# Patient Record
Sex: MALE | Race: WHITE
[De-identification: names, ages, dates, MRNs, and addresses within clinical notes are randomized per-mention and may not be internally consistent; named-entity substitution may affect disease eponyms.]

---

## 2018-02-19 ENCOUNTER — HOSPITAL ENCOUNTER (OUTPATIENT)
Dept: HOSPITAL 62 - OROUT | Age: 58
Discharge: HOME | End: 2018-02-19
Attending: INTERNAL MEDICINE
Payer: OTHER GOVERNMENT

## 2018-02-19 VITALS — DIASTOLIC BLOOD PRESSURE: 89 MMHG | SYSTOLIC BLOOD PRESSURE: 122 MMHG

## 2018-02-19 DIAGNOSIS — K57.30: ICD-10-CM

## 2018-02-19 DIAGNOSIS — K64.8: ICD-10-CM

## 2018-02-19 DIAGNOSIS — Z79.899: ICD-10-CM

## 2018-02-19 DIAGNOSIS — Z79.1: ICD-10-CM

## 2018-02-19 DIAGNOSIS — E78.5: ICD-10-CM

## 2018-02-19 DIAGNOSIS — K52.9: ICD-10-CM

## 2018-02-19 DIAGNOSIS — I10: ICD-10-CM

## 2018-02-19 DIAGNOSIS — Z12.11: Primary | ICD-10-CM

## 2018-02-19 PROCEDURE — 88305 TISSUE EXAM BY PATHOLOGIST: CPT

## 2018-02-19 PROCEDURE — 45380 COLONOSCOPY AND BIOPSY: CPT

## 2018-02-19 PROCEDURE — 0DBC8ZX EXCISION OF ILEOCECAL VALVE, VIA NATURAL OR ARTIFICIAL OPENING ENDOSCOPIC, DIAGNOSTIC: ICD-10-PCS | Performed by: INTERNAL MEDICINE

## 2018-02-19 RX ADMIN — FENTANYL CITRATE ONE MCG: 50 INJECTION INTRAMUSCULAR; INTRAVENOUS at 09:42

## 2018-02-19 RX ADMIN — MIDAZOLAM HYDROCHLORIDE ONE MG: 1 INJECTION, SOLUTION INTRAMUSCULAR; INTRAVENOUS at 09:44

## 2018-02-19 RX ADMIN — MIDAZOLAM HYDROCHLORIDE ONE MG: 1 INJECTION, SOLUTION INTRAMUSCULAR; INTRAVENOUS at 09:47

## 2018-02-19 RX ADMIN — FENTANYL CITRATE ONE MCG: 50 INJECTION INTRAMUSCULAR; INTRAVENOUS at 09:46

## 2018-02-19 RX ADMIN — MIDAZOLAM HYDROCHLORIDE ONE MG: 1 INJECTION, SOLUTION INTRAMUSCULAR; INTRAVENOUS at 09:40

## 2018-02-19 NOTE — OPERATIVE REPORT
Operative Report


DATE OF SURGERY: 02/19/18


Operative Report: 





The risks, benefits and alternatives of the procedure including risks of 

bleeding, perforation requiring surgery are explained to the patient in detail 

and informed consent was obtained.  Patient is taken back to the endoscopy 

suite and placed in the left, lateral decubital position.  Timeout was called.  

Conscious sedation medications are provided.  A rectal examination is done 

which did not reveal any masses, tears or fissures.  An Olympus videoscope was 

inserted into the patient's rectum.  It is carefully advanced all the way to 

the cecum.  The cecum was identified by the usual anatomical landmarks 

including the ileocecal valve as well as the appendiceal office.  

Photodocumentation was obtained.  Prep is good.  The scope was then 

sequentially pulled back via the various segments of the colon including the 

ascending colon, hepatic flexure, transverse colon, splenic flexure, descending 

colon and finding to the rectosigmoid portions of the colon.  Retroflexion 

maneuvers performed.


PREOPERATIVE DIAGNOSIS: Personal history of polyps


POSTOPERATIVE DIAGNOSIS: Diverticulosis.  Mild inflammation noted at the 

ileocecal valve status post biopsy.  Internal hemorrhoids


OPERATION: Colonoscopy with biopsy


SURGEON: JUAN LOPEZ


ANESTHESIA: Moderate Sedation - 5 mg of Versed, 100 mcg of fentanyl.  Conscious 

sedation monitoring time 30 minutes.


TISSUE REMOVED OR ALTERED: As noted above.


COMPLICATIONS: 





None.


ESTIMATED BLOOD LOSS: None.


INTRAOPERATIVE FINDINGS: As noted above.


PROCEDURE: 





Patient tolerated the procedure well.


No immediate postprocedure complications are noted.


Patient discharged in good condition.


Discharge date 2/19/2018


Discharge diet: Regular.


Discharge activity: Regular.


2-3 week follow-up to discuss findings.


Patient is instructed to call the office or proceed to the emergency room 

should there be any further problems or questions.


We will wait on pathology.


If -,5-7 year surveillance colonoscopy.

## 2021-01-22 ENCOUNTER — HOSPITAL ENCOUNTER (OUTPATIENT)
Dept: HOSPITAL 62 - EMPHEALTH | Age: 61
End: 2021-01-22
Attending: INTERNAL MEDICINE
Payer: OTHER GOVERNMENT

## 2021-01-22 DIAGNOSIS — Z23: Primary | ICD-10-CM

## 2021-01-22 PROCEDURE — 91300: CPT
